# Patient Record
(demographics unavailable — no encounter records)

---
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
## 2025-04-30 NOTE — PHYSICAL EXAM
[No Lymphadenopathy] : no lymphadenopathy [Supple] : supple [Normal] : normal rate, regular rhythm, normal S1 and S2 and no murmur heard [No Edema] : there was no peripheral edema [No Focal Deficits] : no focal deficits [Normal Affect] : the affect was normal [Normal Mood] : the mood was normal [Normal Insight/Judgement] : insight and judgment were intact [de-identified] : no calf tenderness b/l LE

## 2025-04-30 NOTE — PHYSICAL EXAM
[Chaperoned Physical Exam] : A chaperone was present in the examining room during all aspects of the physical examination. [MA] : MA [Appropriately responsive] : appropriately responsive [Alert] : alert [No Acute Distress] : no acute distress [No Lymphadenopathy] : no lymphadenopathy [Regular Rate Rhythm] : regular rate rhythm [No Murmurs] : no murmurs [Clear to Auscultation B/L] : clear to auscultation bilaterally [Soft] : soft [Non-tender] : non-tender [Non-distended] : non-distended [No HSM] : No HSM [No Lesions] : no lesions [No Mass] : no mass [Oriented x3] : oriented x3 [Examination Of The Breasts] : a normal appearance [No Masses] : no breast masses were palpable [Labia Majora] : normal [Labia Minora] : normal [Normal] : normal [Uterine Adnexae] : normal

## 2025-04-30 NOTE — PLAN
[FreeTextEntry1] :  est care  hx iron def anemia check b12 /folate, iron studies  pelvic floor dysfunction seeing urologist and undergoing pelvic floor OT  hx anxiety and depression but feeling well, wants to wean off duloxetine sent duloxetine 20mg po daily x 2-4 weeks seeing a therapist regularly Denies suicidal or homicidal ideations  hx vitamin d def check vitamin d level   lab script given  f/u for cpe  advised if patient doesnt hear from me 1 week after testing to call office for results , patient agreed w/plan and understood.